# Patient Record
Sex: FEMALE | Race: ASIAN | Employment: FULL TIME | URBAN - METROPOLITAN AREA
[De-identification: names, ages, dates, MRNs, and addresses within clinical notes are randomized per-mention and may not be internally consistent; named-entity substitution may affect disease eponyms.]

---

## 2017-09-13 ENCOUNTER — OFFICE VISIT (OUTPATIENT)
Dept: FAMILY MEDICINE | Facility: CLINIC | Age: 37
End: 2017-09-13
Payer: COMMERCIAL

## 2017-09-13 VITALS
HEIGHT: 62 IN | HEART RATE: 88 BPM | TEMPERATURE: 98.4 F | BODY MASS INDEX: 16.56 KG/M2 | SYSTOLIC BLOOD PRESSURE: 103 MMHG | OXYGEN SATURATION: 97 % | WEIGHT: 90 LBS | DIASTOLIC BLOOD PRESSURE: 63 MMHG

## 2017-09-13 DIAGNOSIS — M54.2 CERVICALGIA: ICD-10-CM

## 2017-09-13 DIAGNOSIS — Z23 NEED FOR PROPHYLACTIC VACCINATION AND INOCULATION AGAINST INFLUENZA: ICD-10-CM

## 2017-09-13 DIAGNOSIS — Z00.00 ENCOUNTER FOR ROUTINE ADULT HEALTH EXAMINATION WITHOUT ABNORMAL FINDINGS: Primary | ICD-10-CM

## 2017-09-13 DIAGNOSIS — F51.04 CHRONIC INSOMNIA: ICD-10-CM

## 2017-09-13 DIAGNOSIS — G44.209 TENSION HEADACHE: ICD-10-CM

## 2017-09-13 PROCEDURE — G0145 SCR C/V CYTO,THINLAYER,RESCR: HCPCS | Performed by: PHYSICIAN ASSISTANT

## 2017-09-13 PROCEDURE — 90471 IMMUNIZATION ADMIN: CPT | Performed by: PHYSICIAN ASSISTANT

## 2017-09-13 PROCEDURE — 90686 IIV4 VACC NO PRSV 0.5 ML IM: CPT | Performed by: PHYSICIAN ASSISTANT

## 2017-09-13 PROCEDURE — 87624 HPV HI-RISK TYP POOLED RSLT: CPT | Performed by: PHYSICIAN ASSISTANT

## 2017-09-13 PROCEDURE — 99395 PREV VISIT EST AGE 18-39: CPT | Mod: 25 | Performed by: PHYSICIAN ASSISTANT

## 2017-09-13 RX ORDER — AMITRIPTYLINE HYDROCHLORIDE 50 MG/1
50 TABLET ORAL AT BEDTIME
Qty: 30 TABLET | Refills: 3 | Status: SHIPPED | OUTPATIENT
Start: 2017-09-13 | End: 2020-11-27

## 2017-09-13 NOTE — LETTER
September 25, 2017    Simran Lizarraga  [address] [place] [address]    Dear Simran,  We are happy to inform you that your PAP smear result from 09/13/17 is normal.  We are now able to do a follow up test on PAP smears. The DNA test is for HPV (Human Papilloma Virus). Cervical cancer is closely linked with certain types of HPV. Your result showed no evidence of high risk HPV.  Therefore we recommend you return in 3 years for your next pap smear.  You will still need to return to the clinic every year for an annual exam and other preventive tests.  Please contact the clinic at [phone] with any questions.  Sincerely,    Alka Washburn PA-C/jaz

## 2017-09-13 NOTE — MR AVS SNAPSHOT
After Visit Summary   9/13/2017    Simran Lizarraga    MRN: [id number]           Patient Information     Date Of Birth          1980        Visit Information        Provider Department      9/13/2017 1:45 PM Alka Washburn PA-C; [employer] [facility]        Today's Diagnoses     Encounter for routine adult health examination without abnormal findings    -  1    Tension headache        Cervicalgia        Chronic insomnia          Care Instructions      Preventive Health Recommendations  Female Ages 26 - 39  Yearly exam:   See your health care provider every year in order to    Review health changes.     Discuss preventive care.      Review your medicines if you your doctor has prescribed any.    Until age 30: Get a Pap test every three years (more often if you have had an abnormal result).    After age 30: Talk to your doctor about whether you should have a Pap test every 3 years or have a Pap test with HPV screening every 5 years.   You do not need a Pap test if your uterus was removed (hysterectomy) and you have not had cancer.  You should be tested each year for STDs (sexually transmitted diseases), if you're at risk.   Talk to your provider about how often to have your cholesterol checked.  If you are at risk for diabetes, you should have a diabetes test (fasting glucose).  Shots: Get a flu shot each year. Get a tetanus shot every 10 years.   Nutrition:     Eat at least 5 servings of fruits and vegetables each day.    Eat whole-grain bread, whole-wheat pasta and brown rice instead of white grains and rice.    Talk to your provider about Calcium and Vitamin D.     Lifestyle    Exercise at least 150 minutes a week (30 minutes a day, 5 days of the week). This will help you control your weight and prevent disease.    Limit alcohol to one drink per day.    No smoking.     Wear sunscreen to prevent skin cancer.    See your dentist every six months for an exam and  cleaning.            Follow-ups after your visit        Additional Services     SLEEP EVALUATION & MANAGEMENT REFERRAL - ADULT       Please be aware that coverage of these services is subject to the terms and limitations of your health insurance plan.  Call member services at your health plan with any benefit or coverage questions.      Please bring the following to your appointment:    >>   List of current medications   >>   This referral request   >>   Any documents/labs given to you for this referral    [facility])   [phone] (Age 18 and up)                  Future tests that were ordered for you today     Open Future Orders        Priority Expected Expires Ordered    Comprehensive metabolic panel Routine 9/14/2017 11/13/2017 9/13/2017    CBC with platelets Routine 9/14/2017 11/13/2017 9/13/2017    Lipid Profile Routine 9/14/2017 11/13/2017 9/13/2017    SLEEP EVALUATION & MANAGEMENT REFERRAL - ADULT Routine  9/13/2018 9/13/2017            Who to contact     If you have questions or need follow up information about today's clinic visit or your schedule please contact [facility] directly at [phone].  Normal or non-critical lab and imaging results will be communicated to you by MyChart, letter or phone within 4 business days after the clinic has received the results. If you do not hear from us within 7 days, please contact the clinic through MyChart or phone. If you have a critical or abnormal lab result, we will notify you by phone as soon as possible.  Submit refill requests through [employer] or call your pharmacy and they will forward the refill request to us. Please allow 3 business days for your refill to be completed.          Additional Information About Your Visit        Care EveryWhere ID     This is your Care EveryWhere ID. This could be used by other organizations to access your [facility] medical records  [id number]        Your Vitals Were     Pulse Temperature Height  "Last Period Pulse Oximetry BMI (Body Mass Index)    88 98.4  F (36.9  C) (Oral) 5' 1.75\" (1.568 m) 09/01/2017 (Exact Date) 97% 16.59 kg/m2       Blood Pressure from Last 3 Encounters:   09/13/17 103/63   07/25/16 99/64   05/06/15 105/69    Weight from Last 3 Encounters:   09/13/17 90 lb (40.8 kg)   07/25/16 91 lb 5 oz (41.4 kg)   05/06/15 91 lb (41.3 kg)              We Performed the Following     HPV High Risk Types DNA Cervical     Pap imaged thin layer screen with HPV - recommended age 30 - 65 years (select HPV order below)          Today's Medication Changes          These changes are accurate as of: 9/13/17  2:47 PM.  If you have any questions, ask your nurse or doctor.               Start taking these medicines.        Dose/Directions    amitriptyline 50 MG tablet   Commonly known as:  ELAVIL   Used for:  Tension headache, Chronic insomnia   Started by:  Alka Washburn PA-C        Dose:  50 mg   Take 1 tablet (50 mg) by mouth At Bedtime   Quantity:  30 tablet   Refills:  3            Where to get your medicines      These medications were sent to [place] [address] [place] [id number]     Phone:  [phone]     amitriptyline 50 MG tablet                Primary Care Provider Office Phone # Fax #    Alka Washburn PA-C [phone] [address] [address]        Equal Access to Services     [facility] AH: Hadii chioma castillo hadakshat Soana paula, waaxda luqadaha, qaybta kaalmada adejanet, ryan ivy. So [facility] [phone].    ATENCIÓN: Si habla español, tiene a walton disposición servicios gratuitos de asistencia lingüística. Llame al [phone].    We comply with applicable federal civil rights laws and [place] laws. We do not discriminate on the basis of race, color, national origin, age, disability sex, sexual orientation or gender identity.            Thank you!     Thank you for " choosing [facility]  for your care. Our goal is always to provide you with excellent care. Hearing back from our patients is one way we can continue to improve our services. Please take a few minutes to complete the written survey that you may receive in the mail after your visit with us. Thank you!             Your Updated Medication List - Protect others around you: Learn how to safely use, store and throw away your medicines at www.disposemymeds.org.          This list is accurate as of: 9/13/17  2:47 PM.  Always use your most recent med list.                   Brand Name Dispense Instructions for use Diagnosis    amitriptyline 50 MG tablet    ELAVIL    30 tablet    Take 1 tablet (50 mg) by mouth At Bedtime    Tension headache, Chronic insomnia       diphenhydrAMINE-acetaminophen  MG tablet    TYLENOL PM     Take by mouth At Bedtime

## 2017-09-13 NOTE — PROGRESS NOTES
Prior to injection verified patient identity using patient's name and date of birth.  Injectable Influenza Immunization Documentation    1.  Are you sick today? (Fever of 100.5 or higher on the day of the clinic)   No    2.  Have you ever had Guillain-[place] Syndrome within 6 weeks of an influenza vaccionation?  No    3. Do you have a life-threatening allergy to eggs?  No    4. Do you have a life-threatening allergy to a component of the vaccine? May include antibiotics, gelatin or latex.  No     5. Have you ever had a reaction to a dose of flu vaccine that needed immediate medical attention?  No     Form completed by patient.    Kenyetta Gordillo ,CMA

## 2017-09-13 NOTE — PROGRESS NOTES
"   SUBJECTIVE:   CC: Simran Lizarraga is an 37 year old woman who presents for preventive health visit.     Pt here with interpretor    She has neck pain for years described as a tightness in traps bilat up the neck and into the back of her head.  This has been going on since at least 2011 per Epic notes  This comes and goes  Seems to be getting worse.  Denies any injury.  Denies any radicular sxs    She is also having trouble sleeping x several years.  Hard time falling to sleep  Wakes easily  She is not sure why she wakes up  She is not sure if her neck pain waking her up  She tried a new pillow and mattress without relief.  She tried trazodone last year which worked for a few weeks, but then stopped working  Epic notes reveal she tried and failed melatonin as well  Tylenol PM does help but now has bloody nose  She is having some trouble shutting her brain off.  She drinks some coffee in the morning.  Wants \"therapy\" for her insomnia and not a pill      Healthy Habits:    Do you get at least three servings of calcium containing foods daily (dairy, green leafy vegetables, etc.)? yes    Amount of exercise or daily activities, outside of work: 5 day(s) per week    Problems taking medications regularly No    Medication side effects: No    Have you had an eye exam in the past two years? yes    Do you see a dentist twice per year? no    Do you have sleep apnea, excessive snoring or daytime drowsiness?no      Today's PHQ-2 Score:   PHQ-2 ( 1999 Pfizer) 9/13/2017 7/25/2016   Q1: Little interest or pleasure in doing things 0 0   Q2: Feeling down, depressed or hopeless 0 0   PHQ-2 Score 0 0         Abuse: Current or Past(Physical, Sexual or Emotional)- No  Do you feel safe in your environment - Yes  Social History   Substance Use Topics     Smoking status: Never Smoker     Smokeless tobacco: Never Used     Alcohol use Yes      Comment: sometimes     The patient does not drink >3 drinks per day nor >7 drinks per week.    Past " "Medical History:   Diagnosis Date     Headache(784.0) 8/4/2011     Menarche age    cycles Q mo x 5 d     Oligohydramnios      Past Surgical History:   Procedure Laterality Date     LASIK  2011     Current Outpatient Prescriptions   Medication Sig Dispense Refill     amitriptyline (ELAVIL) 50 MG tablet Take 1 tablet (50 mg) by mouth At Bedtime 30 tablet 3     diphenhydrAMINE-acetaminophen (TYLENOL PM)  MG tablet Take by mouth At Bedtime                ROS:  C: NEGATIVE for fever, chills, change in weight  I: NEGATIVE for worrisome rashes, moles or lesions  E: NEGATIVE for vision changes or irritation  ENT: NEGATIVE for ear, mouth and throat problems  R: NEGATIVE for significant cough or SOB  B: NEGATIVE for masses, tenderness or discharge  CV: NEGATIVE for chest pain, palpitations or peripheral edema  GI: NEGATIVE for nausea, abdominal pain, heartburn, or change in bowel habits  : NEGATIVE for unusual urinary or vaginal symptoms. Periods are regular.  M: neck pain as above  N: NEGATIVE for weakness, dizziness or paresthesias  P: NEGATIVE for changes in mood or affect    OBJECTIVE:   /63 (BP Location: Left arm, Cuff Size: Adult Small)  Pulse 88  Temp 98.4  F (36.9  C) (Oral)  Ht 5' 1.75\" (1.568 m)  Wt 90 lb (40.8 kg)  LMP 09/01/2017 (Exact Date)  SpO2 97%  BMI 16.59 kg/m2  EXAM:  GENERAL: healthy, alert and no distress  EYES: Eyes grossly normal to inspection, PERRL and conjunctivae and sclerae normal  HENT: ear canals and TM's normal, nose and mouth without ulcers or lesions  NECK: no adenopathy, no asymmetry, masses, or scars and thyroid normal to palpation  RESP: lungs clear to auscultation - no rales, rhonchi or wheezes  BREAST: normal without masses, tenderness or nipple discharge and no palpable axillary masses or adenopathy  CV: regular rate and rhythm, normal S1 S2, no S3 or S4, no murmur, click or rub, no peripheral edema and peripheral pulses strong  ABDOMEN: soft, nontender, no " "hepatosplenomegaly, no masses and bowel sounds normal  : no vulvar lesions. Speculum exam normal. Pap done and sent.  MS: no gross musculoskeletal defects noted, no edema  SKIN: no suspicious lesions or rashes  NEURO: Normal strength and tone, mentation intact and speech normal  PSYCH: mentation appears normal, affect normal/bright    ASSESSMENT/PLAN:   Assessment and Plan:     (Z00.00) Encounter for routine adult health examination without abnormal findings  (primary encounter diagnosis)  Comment:   Plan: Pap imaged thin layer screen with HPV -         recommended age 30 - 65 years (select HPV order        below), HPV High Risk Types DNA Cervical,         Comprehensive metabolic panel, CBC with         platelets, Lipid Profile            (G44.209) Tension headache  Comment:   Plan: Recd she try amitriptyline (ELAVIL) 50 MG tablet Qhs as this may help with the chronic neck pain and insomnia            (M54.2) Cervicalgia  Comment:   Plan: stretching, head, consider Pt if the elavil doesn't help    (F51.04) Chronic insomnia  Comment:   Plan: SLEEP EVALUATION & MANAGEMENT REFERRAL - ADULT,        amitriptyline (ELAVIL) 50 MG tablet            (Z23) Need for prophylactic vaccination and inoculation against influenza  Comment:   Plan: FLU VAC, SPLIT VIRUS IM > 3 YO (QUADRIVALENT)         [39719], Vaccine Administration, Initial         [31489]              COUNSELING:   Reviewed preventive health counseling, as reflected in patient instructions         reports that she has never smoked. She has never used smokeless tobacco.    Estimated body mass index is 16.59 kg/(m^2) as calculated from the following:    Height as of this encounter: 5' 1.75\" (1.568 m).    Weight as of this encounter: 90 lb (40.8 kg).       Counseling Resources:  ATP IV Guidelines  Pooled Cohorts Equation Calculator  Breast Cancer Risk Calculator  FRAX Risk Assessment  ICSI Preventive Guidelines  Dietary Guidelines for Americans, 2010  USDA's " MyPlate  ASA Prophylaxis  Lung CA Screening    Alka Washburn PA-C  [facility]

## 2017-09-13 NOTE — NURSING NOTE
"Chief Complaint   Patient presents with     Physical     Not fasting       Initial /63 (BP Location: Left arm, Cuff Size: Adult Small)  Pulse 88  Temp 98.4  F (36.9  C) (Oral)  Ht 5' 1.75\" (1.568 m)  Wt 90 lb (40.8 kg)  LMP 09/01/2017 (Exact Date)  SpO2 97%  BMI 16.59 kg/m2 Estimated body mass index is 16.59 kg/(m^2) as calculated from the following:    Height as of this encounter: 5' 1.75\" (1.568 m).    Weight as of this encounter: 90 lb (40.8 kg).  Medication Reconciliation: complete     Kenyetta Gordillo CMA      "

## 2017-09-13 NOTE — LETTER
[address] [address]  [phone]        November 20, 2017    Simran Lizarraga  [address] [place] [address]              Dear Simran Lizarraga    This is to remind you that your fasting labs are due.    You may call our office at [phone] to schedule an appointment.    Please disregard this notice if you have already had your labs drawn or made an appointment.        Sincerely,        [employer]ka [employer]

## 2017-09-18 LAB
COPATH REPORT: NORMAL
PAP: NORMAL

## 2017-09-19 LAB
FINAL DIAGNOSIS: NORMAL
HPV HR 12 DNA CVX QL NAA+PROBE: NEGATIVE
HPV16 DNA SPEC QL NAA+PROBE: NEGATIVE
HPV18 DNA SPEC QL NAA+PROBE: NEGATIVE
SPECIMEN DESCRIPTION: NORMAL

## 2017-10-06 ENCOUNTER — TELEPHONE (OUTPATIENT)
Dept: SLEEP MEDICINE | Facility: CLINIC | Age: 37
End: 2017-10-06

## 2017-10-06 NOTE — TELEPHONE ENCOUNTER
Spoke to patient in regards to scheduling a consult. Patient stated she will call back to schedule.

## 2017-11-30 DIAGNOSIS — Z00.00 ENCOUNTER FOR ROUTINE ADULT HEALTH EXAMINATION WITHOUT ABNORMAL FINDINGS: ICD-10-CM

## 2017-11-30 LAB
ALBUMIN SERPL-MCNC: 3.7 G/DL (ref 3.4–5)
ALP SERPL-CCNC: 53 U/L (ref 40–150)
ALT SERPL W P-5'-P-CCNC: 21 U/L (ref 0–50)
ANION GAP SERPL CALCULATED.3IONS-SCNC: 9 MMOL/L (ref 3–14)
AST SERPL W P-5'-P-CCNC: 15 U/L (ref 0–45)
BILIRUB SERPL-MCNC: 0.4 MG/DL (ref 0.2–1.3)
BUN SERPL-MCNC: 14 MG/DL (ref 7–30)
CALCIUM SERPL-MCNC: 8.7 MG/DL (ref 8.5–10.1)
CHLORIDE SERPL-SCNC: 106 MMOL/L (ref 94–109)
CHOLEST SERPL-MCNC: 124 MG/DL
CO2 SERPL-SCNC: 25 MMOL/L (ref 20–32)
CREAT SERPL-MCNC: 0.69 MG/DL (ref 0.52–1.04)
ERYTHROCYTE [DISTWIDTH] IN BLOOD BY AUTOMATED COUNT: 12.9 % (ref 10–15)
GFR SERPL CREATININE-BSD FRML MDRD: >90 ML/MIN/1.7M2
GLUCOSE SERPL-MCNC: 84 MG/DL (ref 70–99)
HCT VFR BLD AUTO: 40 % (ref 35–47)
HDLC SERPL-MCNC: 80 MG/DL
HGB BLD-MCNC: 13.3 G/DL (ref 11.7–15.7)
LDLC SERPL CALC-MCNC: 29 MG/DL
MCH RBC QN AUTO: 31.2 PG (ref 26.5–33)
MCHC RBC AUTO-ENTMCNC: 33.3 G/DL (ref 31.5–36.5)
MCV RBC AUTO: 94 FL (ref 78–100)
NONHDLC SERPL-MCNC: 44 MG/DL
PLATELET # BLD AUTO: 237 10E9/L (ref 150–450)
POTASSIUM SERPL-SCNC: 4.1 MMOL/L (ref 3.4–5.3)
PROT SERPL-MCNC: 7.4 G/DL (ref 6.8–8.8)
RBC # BLD AUTO: 4.26 10E12/L (ref 3.8–5.2)
SODIUM SERPL-SCNC: 140 MMOL/L (ref 133–144)
TRIGL SERPL-MCNC: 74 MG/DL
WBC # BLD AUTO: 5.7 10E9/L (ref 4–11)

## 2017-11-30 PROCEDURE — 80053 COMPREHEN METABOLIC PANEL: CPT | Performed by: PHYSICIAN ASSISTANT

## 2017-11-30 PROCEDURE — 36415 COLL VENOUS BLD VENIPUNCTURE: CPT | Performed by: PHYSICIAN ASSISTANT

## 2017-11-30 PROCEDURE — 85027 COMPLETE CBC AUTOMATED: CPT | Performed by: PHYSICIAN ASSISTANT

## 2017-11-30 PROCEDURE — 80061 LIPID PANEL: CPT | Performed by: PHYSICIAN ASSISTANT

## 2017-11-30 NOTE — PROGRESS NOTES
I wanted to get back to you with your test results.  I have enclosed a copy for your review.      I am happy to report that your CBC or complete blood count is normal with no signs of anemia, leukemia or platelet abnormalities. Your chemistry panel shows no signs of diabetes.  Your blood salts, kidney tests, liver tests, and proteins are all fine.    Your total cholesterol is 124 with the normal range being below 200.  Your HDL or good cholesterol is 80 with the normal range being above 50.  Your LDL or bad cholesterol is 29 with the normal range being below 160.  Everything looks great.    Alka Washburn PA-C

## 2017-11-30 NOTE — LETTER
[address]  [place]  [address]  Tel: [phone]    November 30, 2017    Simran Elham  [address] [place] [address]        Dear Ms. Lizarraga,    I wanted to get back to you with your test results.  I have enclosed a copy for your review.      I am happy to report that your CBC or complete blood count is normal with no signs of anemia, leukemia or platelet abnormalities. Your chemistry panel shows no signs of diabetes.  Your blood salts, kidney tests, liver tests, and proteins are all fine.    Your total cholesterol is 124 with the normal range being below 200.  Your HDL or good cholesterol is 80 with the normal range being above 50.  Your LDL or bad cholesterol is 29 with the normal range being below 160.  Everything looks great.    If you have any further questions or problems, please contact our office.      Sincerely,    Alka Washburn PA-C/ANA LILIA      Enclosure: Lab Results  Results for orders placed or performed in visit on 11/30/17   Comprehensive metabolic panel   Result Value Ref Range    Sodium 140 133 - 144 mmol/L    Potassium 4.1 3.4 - 5.3 mmol/L    Chloride 106 94 - 109 mmol/L    Carbon Dioxide 25 20 - 32 mmol/L    Anion Gap 9 3 - 14 mmol/L    Glucose 84 70 - 99 mg/dL    Urea Nitrogen 14 7 - 30 mg/dL    Creatinine 0.69 0.52 - 1.04 mg/dL    GFR Estimate >90 >60 mL/min/1.7m2    GFR Estimate If Black >90 >60 mL/min/1.7m2    Calcium 8.7 8.5 - 10.1 mg/dL    Bilirubin Total 0.4 0.2 - 1.3 mg/dL    Albumin 3.7 3.4 - 5.0 g/dL    Protein Total 7.4 6.8 - 8.8 g/dL    Alkaline Phosphatase 53 40 - 150 U/L    ALT 21 0 - 50 U/L    AST 15 0 - 45 U/L   CBC with platelets   Result Value Ref Range    WBC 5.7 4.0 - 11.0 10e9/L    RBC Count 4.26 3.8 - 5.2 10e12/L    Hemoglobin 13.3 11.7 - 15.7 g/dL    Hematocrit 40.0 35.0 - 47.0 %    MCV 94 78 - 100 fl    MCH 31.2 26.5 - 33.0 pg    MCHC 33.3 31.5 - 36.5 g/dL    RDW 12.9 10.0 - 15.0 %    Platelet Count 237 150 - 450 10e9/L    Lipid Profile   Result Value Ref Range    Cholesterol 124 <200 mg/dL    Triglycerides 74 <150 mg/dL    HDL Cholesterol 80 >49 mg/dL    LDL Cholesterol Calculated 29 <100 mg/dL    Non HDL Cholesterol 44 <130 mg/dL

## 2020-03-01 ENCOUNTER — HEALTH MAINTENANCE LETTER (OUTPATIENT)
Age: 40
End: 2020-03-01

## 2020-11-27 ENCOUNTER — OFFICE VISIT (OUTPATIENT)
Dept: FAMILY MEDICINE | Facility: CLINIC | Age: 40
End: 2020-11-27
Payer: COMMERCIAL

## 2020-11-27 VITALS
OXYGEN SATURATION: 99 % | BODY MASS INDEX: 17.55 KG/M2 | WEIGHT: 95.38 LBS | TEMPERATURE: 97.8 F | HEIGHT: 62 IN | SYSTOLIC BLOOD PRESSURE: 106 MMHG | HEART RATE: 80 BPM | DIASTOLIC BLOOD PRESSURE: 68 MMHG

## 2020-11-27 DIAGNOSIS — Z00.00 ROUTINE GENERAL MEDICAL EXAMINATION AT A HEALTH CARE FACILITY: Primary | ICD-10-CM

## 2020-11-27 DIAGNOSIS — Z13.1 SCREENING FOR DIABETES MELLITUS: ICD-10-CM

## 2020-11-27 DIAGNOSIS — Z11.59 ENCOUNTER FOR HEPATITIS C SCREENING TEST FOR LOW RISK PATIENT: ICD-10-CM

## 2020-11-27 DIAGNOSIS — G47.00 INSOMNIA, UNSPECIFIED TYPE: ICD-10-CM

## 2020-11-27 DIAGNOSIS — Z13.220 SCREENING FOR LIPID DISORDERS: ICD-10-CM

## 2020-11-27 DIAGNOSIS — Z12.4 SCREENING FOR CERVICAL CANCER: ICD-10-CM

## 2020-11-27 LAB
CHOLEST SERPL-MCNC: 132 MG/DL
GLUCOSE SERPL-MCNC: 113 MG/DL (ref 70–99)
HDLC SERPL-MCNC: 74 MG/DL
HGB BLD-MCNC: 13.2 G/DL (ref 11.7–15.7)
LDLC SERPL CALC-MCNC: 21 MG/DL
NONHDLC SERPL-MCNC: 58 MG/DL
TRIGL SERPL-MCNC: 186 MG/DL

## 2020-11-27 PROCEDURE — 80061 LIPID PANEL: CPT | Performed by: INTERNAL MEDICINE

## 2020-11-27 PROCEDURE — 82947 ASSAY GLUCOSE BLOOD QUANT: CPT | Performed by: INTERNAL MEDICINE

## 2020-11-27 PROCEDURE — 90471 IMMUNIZATION ADMIN: CPT | Performed by: INTERNAL MEDICINE

## 2020-11-27 PROCEDURE — 36415 COLL VENOUS BLD VENIPUNCTURE: CPT | Performed by: INTERNAL MEDICINE

## 2020-11-27 PROCEDURE — 90686 IIV4 VACC NO PRSV 0.5 ML IM: CPT | Performed by: INTERNAL MEDICINE

## 2020-11-27 PROCEDURE — 85018 HEMOGLOBIN: CPT | Performed by: INTERNAL MEDICINE

## 2020-11-27 PROCEDURE — 99385 PREV VISIT NEW AGE 18-39: CPT | Mod: 25 | Performed by: INTERNAL MEDICINE

## 2020-11-27 PROCEDURE — G0145 SCR C/V CYTO,THINLAYER,RESCR: HCPCS | Performed by: INTERNAL MEDICINE

## 2020-11-27 PROCEDURE — 86803 HEPATITIS C AB TEST: CPT | Performed by: INTERNAL MEDICINE

## 2020-11-27 PROCEDURE — 87624 HPV HI-RISK TYP POOLED RSLT: CPT | Performed by: INTERNAL MEDICINE

## 2020-11-27 ASSESSMENT — MIFFLIN-ST. JEOR: SCORE: 1057.87

## 2020-11-27 NOTE — PROGRESS NOTES
SUBJECTIVE:   CC: Simran Lizarraga is an 40 year old woman who presents for preventive health visit.     Lives with  and two kids.  Ages 7 and 11.  Works in computer science, working from home.     Her father  from a stroke in her mother  from liver cancer.  She is wondering if there is any blood work that is needed to screen for these conditions.    She has a long history of insomnia.  Has been taking different over-the-counter sleep aids for years.  She would like to see a specialist to see if the insomnia can be treated with something besides medications.    Patient has been advised of split billing requirements and indicates understanding: Yes  Healthy Habits:    Do you get at least three servings of calcium containing foods daily (dairy, green leafy vegetables, etc.)? yes    Amount of exercise or daily activities, outside of work: 7 day(s) per week    Problems taking medications regularly No    Medication side effects: No    Have you had an eye exam in the past two years? no    Do you see a dentist twice per year? yes    Do you have sleep apnea, excessive snoring or daytime drowsiness?yes      Today's PHQ-2 Score:   PHQ-2 (  [employer]) 2020   Q1: Little interest or pleasure in doing things 0 0   Q2: Feeling down, depressed or hopeless 0 0   PHQ-2 Score 0 0       Abuse: Current or Past(Physical, Sexual or Emotional)- No  Do you feel safe in your environment? Yes        Social History     Tobacco Use     Smoking status: Never Smoker     Smokeless tobacco: Never Used   Substance Use Topics     Alcohol use: Yes     Comment: sometimes     If you drink alcohol do you typically have >3 drinks per day or >7 drinks per week? No                     Reviewed orders with patient.  Reviewed health maintenance and updated orders accordingly - Yes  Patient Active Problem List   Diagnosis     CARDIOVASCULAR SCREENING; LDL GOAL LESS THAN 160     Headache     ; [facility]->[facility] care     Vitamin D  deficiency <24     + GBS on UC w/o sxs     Oligohydramnios in third trimester of the first pregnancy     Fetal VSD     SROM (spontaneous rupture of membranes)     Persistent insomnia     Swelling, mass, or lump in head and neck     Past Surgical History:   Procedure Laterality Date     LASIK  2011       Social History     Tobacco Use     Smoking status: Never Smoker     Smokeless tobacco: Never Used   Substance Use Topics     Alcohol use: Yes     Comment: sometimes     Family History   Problem Relation Age of Onset     Hypertension Father      Cerebrovascular Disease Father      Cancer Mother         liver cancer     Other - See Comments Daughter         neuroblastoma.         Current Outpatient Medications   Medication Sig Dispense Refill     diphenhydrAMINE HCl, Sleep, (ZZZQUIL PO)        diphenhydrAMINE-acetaminophen (TYLENOL PM)  MG tablet Take by mouth At Bedtime       doxylamine (UNISOM) 25 MG TABS tablet Take 25 mg by mouth At Bedtime       Ibuprofen-diphenhydrAMINE Cit (ADVIL PM PO)          Mammogram not appropriate for this patient based on age.    Pertinent mammograms are reviewed under the imaging tab.  History of abnormal Pap smear: NO - age 30-65 PAP every 5 years with negative HPV co-testing recommended  PAP / HPV Latest Ref Rng & Units 9/13/2017 11/24/2014 8/4/2011   PAP - NIL NIL NIL   HPV 16 DNA NEG:Negative Negative - -   HPV 18 DNA NEG:Negative Negative - -   OTHER HR HPV NEG:Negative Negative - -     Reviewed and updated as needed this visit by clinical staff  Tobacco  Allergies  Meds   Med Hx  Surg Hx  Fam Hx  Soc Hx        Reviewed and updated as needed this visit by Provider  Tobacco   Meds   Med Hx  Surg Hx  Fam Hx  Soc Hx           ROS:  CONSTITUTIONAL: NEGATIVE for fever, chills, change in weight  INTEGUMENTARU/SKIN: NEGATIVE for worrisome rashes, moles or lesions  EYES: NEGATIVE for vision changes or irritation  ENT: NEGATIVE for ear, mouth and throat problems  RESP:  "NEGATIVE for significant cough or SOB  BREAST: NEGATIVE for masses, tenderness or discharge  CV: NEGATIVE for chest pain, palpitations or peripheral edema  GI: NEGATIVE for nausea, abdominal pain, heartburn, or change in bowel habits  : NEGATIVE for unusual urinary or vaginal symptoms. Periods are regular.  MUSCULOSKELETAL: NEGATIVE for significant arthralgias or myalgia  NEURO: NEGATIVE for weakness, dizziness or paresthesias  PSYCHIATRIC: NEGATIVE for changes in mood or affect    OBJECTIVE:   /68   Pulse 80   Temp 97.8  F (36.6  C) (Tympanic)   Ht 1.57 m (5' 1.81\")   Wt 43.3 kg (95 lb 6 oz)   LMP 10/30/2020   SpO2 99%   BMI 17.55 kg/m    EXAM:  GENERAL: healthy, alert and no distress  EYES: Eyes grossly normal to inspection, PERRL and conjunctivae and sclerae normal  HENT: ear canals and TM's normal, mouth without ulcers or lesions  NECK: no adenopathy, no asymmetry, masses, or scars and thyroid normal to palpation  RESP: lungs clear to auscultation - no rales, rhonchi or wheezes  BREAST: normal without masses, tenderness or nipple discharge and no palpable axillary masses or adenopathy  CV: regular rate and rhythm, normal S1 S2, no S3 or S4, no murmur, click or rub, no peripheral edema and peripheral pulses strong  ABDOMEN: soft, nontender, no hepatosplenomegaly, no masses and bowel sounds normal   (female): normal female external genitalia, normal urethral meatus, vaginal mucosa pink, moist, well rugated, and normal cervix, slight bleeding with pap   MS: no gross musculoskeletal defects noted, no edema  SKIN: no suspicious lesions or rashes  NEURO: Normal strength and tone, mentation intact and speech normal  PSYCH: mentation appears normal, affect normal/bright        ASSESSMENT/PLAN:   1. Routine general medical examination at a health care facility  Pap today   Blood work   Flu shot  Using condoms for contraception, declines desire for other method    - Hemoglobin    2. Insomnia, unspecified " "type  - SLEEP EVALUATION & MANAGEMENT REFERRAL - ADULT -[facility] [place] [phone]  (Age 18 and up); Future    3. Screening for lipid disorders  - Lipid panel reflex to direct LDL Fasting    4. Screening for cervical cancer  - Pap imaged thin layer screen with HPV - recommended age 30 - 65 years (select HPV order below)  - HPV High Risk Types DNA Cervical    5. Screening for diabetes mellitus  - Glucose    6. 7. Encounter for hepatitis C screening test for low risk patient  - Hepatitis C antibody      COUNSELING:   Reviewed preventive health counseling, as reflected in patient instructions       Regular exercise       Healthy diet/nutrition       Contraception    Estimated body mass index is 17.55 kg/m  as calculated from the following:    Height as of this encounter: 1.57 m (5' 1.81\").    Weight as of this encounter: 43.3 kg (95 lb 6 oz).        She reports that she has never smoked. She has never used smokeless tobacco.      Counseling Resources:  ATP IV Guidelines  Pooled Cohorts Equation Calculator  Breast Cancer Risk Calculator  BRCA-Related Cancer Risk Assessment: FHS-7 Tool  FRAX Risk Assessment  ICSI Preventive Guidelines  Dietary Guidelines for Americans, 2010  [employer]'s MyPlate  ASA Prophylaxis  Lung CA Screening    Emily Riddle MD  [facility]  "

## 2020-11-29 LAB — HCV AB SERPL QL IA: NONREACTIVE

## 2020-12-01 LAB
COPATH REPORT: NORMAL
PAP: NORMAL

## 2020-12-03 LAB
FINAL DIAGNOSIS: NORMAL
HPV HR 12 DNA CVX QL NAA+PROBE: NEGATIVE
HPV16 DNA SPEC QL NAA+PROBE: NEGATIVE
HPV18 DNA SPEC QL NAA+PROBE: NEGATIVE
SPECIMEN DESCRIPTION: NORMAL
SPECIMEN SOURCE CVX/VAG CYTO: NORMAL

## 2021-05-07 ENCOUNTER — VIRTUAL VISIT (OUTPATIENT)
Dept: SLEEP MEDICINE | Facility: CLINIC | Age: 41
End: 2021-05-07
Attending: INTERNAL MEDICINE
Payer: COMMERCIAL

## 2021-05-07 VITALS — HEIGHT: 61 IN | WEIGHT: 96 LBS | BODY MASS INDEX: 18.12 KG/M2

## 2021-05-07 DIAGNOSIS — G47.00 PERSISTENT INSOMNIA: Primary | ICD-10-CM

## 2021-05-07 DIAGNOSIS — G47.8 NON-RESTORATIVE SLEEP: ICD-10-CM

## 2021-05-07 DIAGNOSIS — M54.2 CHRONIC NECK PAIN: ICD-10-CM

## 2021-05-07 DIAGNOSIS — G89.29 CHRONIC NECK PAIN: ICD-10-CM

## 2021-05-07 DIAGNOSIS — G47.9 SLEEP DISTURBANCE: ICD-10-CM

## 2021-05-07 DIAGNOSIS — R51.9 SLEEP RELATED HEADACHES: ICD-10-CM

## 2021-05-07 PROCEDURE — 99204 OFFICE O/P NEW MOD 45 MIN: CPT | Mod: TEL | Performed by: INTERNAL MEDICINE

## 2021-05-07 ASSESSMENT — MIFFLIN-ST. JEOR: SCORE: 1042.83

## 2021-05-07 NOTE — PROGRESS NOTES
"Simran Lizarraga is a 41 year old female being evaluated via a billable telephone visit.     \"This telephone visit will be conducted via a call between you and your physician/provider. We have found that certain health care needs can be provided without the need for an in-person visit or physical exam.  This service lets us provide the care you need with a telephone conversation.  If a prescription is necessary we can send it directly to your pharmacy.  If lab work is needed we can place an order for that and you can then stop by our lab to have the test done at a later time.\"    Telephone visits are billed at different rates depending on your insurance coverage.  Please reach out to your insurance provider with any questions.    Patient has given verbal consent for  a Telephone visit? Yes    What telephone number would you like your provider to contact at at:  [phone]    How would you like to obtain your AVS? Buddy Castellon [facility]    Telephone Visit Details:     Telephone Visit Start Time: 9:59 AM    Telephone Visit End Time:10:51 AM    All the information was obtained and communicated with the help of the interpretor.    Thank you for the opportunity to participate in the care of  Simran Lizarraga.    Assessment and Plan:    In summary Simran Lizarraga is a 41 year old year old female here for sleep disturbance.  1. Persistent Insomnia/Non-restorative sleep/Sleep related headaches/Chronic neck pain/Sleep disturbance   Simran Lizarraga has high risk for obstructive sleep apnea based on the history of persistent insomnia, non-restoratvie sleep and sleep related head and neck pains. I recommend getting  an baseline nocturnal polysomnography. The patient should return to the clinic to discuss results and treatment option in a patient-centered approach. I recommend the patient to discontinue Tylenol and Ibuprofen PM. I will also refer the patient to be evaluated by a sleep psychologist. She will follow up " with her PCP about the neck pain.  - SLEEP PSYCHOLOGY REFERRAL  - Comprehensive Sleep Study; Future    History of present illness:    She is a 41 year old female who comes to the [facility] clinic with a chief complaint of persistent insomnia that has been going on all of her life. While she denies any episodes of witness apnea or snoring, she has been having difficulty initiating and maintaining sleep. She has been self medicating with OTC sleep aides such as Tylenol PM as well as Ibuprofen. She has also used Unysom, Melatonin and Zqueel. PMFor the past year, her symptoms have worsened such that she is waking up with head and neck pains. Her neck pain starts in her back, it feels like a dull, numb pain and radiates down both shoulders. While she does suffer from some anxiety, she denies it being a significant factor.     Ideal Sleep-Wake Cycle(devoid of societal pressure):    Patient would try to initiate sleep at around Midnight with a sleep latency of 2 hours. The patient would have 4-5 awakenings. Final wake up time is around 6AM.    ROYCE:  ROYCE Total Score: 18  Total score - [place]: 2 (5/7/2021  9:00 AM)    Patient told to return in one week after the sleep study is interpreted.    Patient Active Problem List   Diagnosis     CARDIOVASCULAR SCREENING; LDL GOAL LESS THAN 160     Vitamin D deficiency <24     Persistent insomnia     Past Medical History:   Diagnosis Date     Insomnia      Past Surgical History:   Procedure Laterality Date     LASIK  2011     Current Outpatient Medications   Medication Sig Dispense Refill     diphenhydrAMINE HCl, Sleep, (ZZZQUIL PO)        diphenhydrAMINE-acetaminophen (TYLENOL PM)  MG tablet Take by mouth At Bedtime       doxylamine (UNISOM) 25 MG TABS tablet Take 25 mg by mouth At Bedtime       Ibuprofen-diphenhydrAMINE Cit (ADVIL PM PO)        No known allergies  Social History     Socioeconomic History     Marital status:      Spouse name: Not on file     Number of  children: 1     Years of education: Not on file     Highest education level: Not on file   Occupational History     Not on file   Social Needs     Financial resource strain: Not on file     Food insecurity     Worry: Not on file     Inability: Not on file     Transportation needs     Medical: Not on file     Non-medical: Not on file   Tobacco Use     Smoking status: Never Smoker     Smokeless tobacco: Never Used   Substance and Sexual Activity     Alcohol use: Yes     Comment: sometimes     Drug use: No     Sexual activity: Yes     Partners: Male     Birth control/protection: Condom   Lifestyle     Physical activity     Days per week: Not on file     Minutes per session: Not on file     Stress: Not on file   Relationships     Social connections     Talks on phone: Not on file     Gets together: Not on file     Attends Shinto service: Not on file     Active member of club or organization: Not on file     Attends meetings of clubs or organizations: Not on file     Relationship status: Not on file     Intimate partner violence     Fear of current or ex partner: Not on file     Emotionally abused: Not on file     Physically abused: Not on file     Forced sexual activity: Not on file   Other Topics Concern     Parent/sibling w/ CABG, MI or angioplasty before 65F 55M? No   Social History Narrative        2 children ages 7 and 3.5    Computer science     Family History   Problem Relation Age of Onset     Hypertension Father      Cerebrovascular Disease Father      Cancer Mother         liver cancer     Other - See Comments Daughter         neuroblastoma.        Labs/Studies:     No results found for: PH, PHARTERIAL, PO2, [id number], SAT, PCO2, HCO3, BASEEXCESS, JAYSHREE, BEB  Lab Results   Component Value Date    TSH 0.89 07/25/2016    TSH 0.77 11/24/2014     Lab Results   Component Value Date     (H) 11/27/2020    GLC 84 11/30/2017     Lab Results   Component Value Date    HGB 13.2 11/27/2020    HGB 13.3  11/30/2017     Lab Results   Component Value Date    BUN 14 11/30/2017    BUN 17 07/25/2016    CR 0.69 11/30/2017    CR 0.53 07/25/2016     Lab Results   Component Value Date    AST 15 11/30/2017    AST 13 07/25/2016    ALT 21 11/30/2017    ALT 18 07/25/2016    ALKPHOS 53 11/30/2017    ALKPHOS 58 07/25/2016    BILITOTAL 0.4 11/30/2017    BILITOTAL 0.3 07/25/2016     No results found for: UAMP, UBARB, BENZODIAZEUR, UCANN, UCOC, OPIT, UPCP    Recent Labs   Lab Test 11/27/20  1520 11/30/17  0906 08/10/16  0752 07/25/16  1050   NA  --  140  --  138   POTASSIUM  --  4.1  --  3.9   CHLORIDE  --  106  --  107   CO2  --  25  --  26   ANIONGAP  --  9  --  5   * 84  --  58*   BUN  --  14  --  17   CR  --  0.69  --  0.53   DASIA  --  8.7 9.4 8.3*       No results found for: KEESHA Pérez DO  Board Certified in Internal Medicine and Sleep Medicine    (Note created with Dragon voice recognition and unintended spelling errors and word substitutions may occur)

## 2021-05-07 NOTE — PATIENT INSTRUCTIONS
Your BMI is Body mass index is 18.14 kg/m .  Weight management is a personal decision.  If you are interested in exploring weight loss strategies, the following discussion covers the approaches that may be successful. Body mass index (BMI) is one way to tell whether you are at a healthy weight, overweight, or obese. It measures your weight in relation to your height.  A BMI of 18.5 to 24.9 is in the healthy range. A person with a BMI of 25 to 29.9 is considered overweight, and someone with a BMI of 30 or greater is considered obese. More than two-thirds of American adults are considered overweight or obese.  Being overweight or obese increases the risk for further weight gain. Excess weight may lead to heart disease and diabetes.  Creating and following plans for healthy eating and physical activity may help you improve your health.  Weight control is part of healthy lifestyle and includes exercise, emotional health, and healthy eating habits. Careful eating habits lifelong are the mainstay of weight control. Though there are significant health benefits from weight loss, long-term weight loss with diet alone may be very difficult to achieve- studies show long-term success with dietary management in less than 10% of people. Attaining a healthy weight may be especially difficult to achieve in those with severe obesity. In some cases, medications, devices and surgical management might be considered.  What can you do?  If you are overweight or obese and are interested in methods for weight loss, you should discuss this with your provider.     Consider reducing daily calorie intake by 500 calories.     Keep a food journal.     Avoiding skipping meals, consider cutting portions instead.    Diet combined with exercise helps maintain muscle while optimizing fat loss. Strength training is particularly important for building and maintaining muscle mass. Exercise helps reduce stress, increase energy, and improves fitness.  Increasing exercise without diet control, however, may not burn enough calories to loose weight.       Start walking three days a week 10-20 minutes at a time    Work towards walking thirty minutes five days a week     Eventually, increase the speed of your walking for 1-2 minutes at time    In addition, we recommend that you review healthy lifestyles and methods for weight loss available through the National Institutes of Health patient information sites:  http://win.niddk.nih.gov/publications/index.htm    And look into health and wellness programs that may be available through your health insurance provider, employer, local community center, or urszula club.    Dr. Andre Blancas is at the following clinics on the following days:  [facility] - [address], [place]        Thursday and Friday (PLEASE CALL [phone] to schedule an appointment).  [facility] - [address] [place], [place]       Wednesdays   (PLEASE CALL [phone] to schedule an appointment).    Patient education: What is a sleep study?     What is a sleep study? -- A sleep study is a test that measures how well you sleep and checks for sleep problems. For some sleep studies, you stay overnight in a sleep lab at a hospital or sleep center.     What happens during a sleep study? -- Before you go to sleep, a technician attaches small, sticky patches called  electrodes  to your head, chest, and legs. He or she will also place a small tube beneath your nose and might wrap 1 or 2 belts around your chest.   Each of these items has wires that connect to monitors. The monitors record your movement, brain activity, breathing, and other body functions while you sleep.  If you have a history of trouble falling asleep, your doctor might prescribe a medicine to help you fall asleep in the lab. If you have never taken the medicine before, your doctor might ask you take it on a night before your sleep study to see how it affects  you.   Why might my doctor order a sleep study? -- Your doctor will order a sleep study if he or she thinks you have sleep apnea or a different condition that makes you:   ?Have sudden jerking leg movements while you sleep, called  periodic limb movements.    ?Feel very sleepy during the day and fall asleep all of a sudden, called  narcolepsy.    ?Have trouble falling asleep or staying asleep over a long period of time, called  chronic insomnia.    ?Do odd things while you sleep, such as walking.  How should I prepare for a sleep study? -- On the day of your sleep study, you should:   ?Avoid alcohol   ?Avoid drinking coffee, tea, sodas, and other drinks that have caffeine in the afternoon and evening   ?Take all of your regular medicines     The cost of care estimate line is [phone]. They are able to give the patient an estimate of the charges and also an estimate of their insurance coverage/patient responsibility.   After your sleep study is performed, please call us at [phone] or [phone]  to schedule for a follow up to review the results of the sleep study.    Please bring one tab of low dose melatonin 3 mg or less to the night of the study.    Melatonin intake is completely voluntary.    You may take own melatonin after arrival to sleep center. Do not drive or operate machinery after intake of melatonin.

## 2021-09-13 ENCOUNTER — TELEPHONE (OUTPATIENT)
Dept: FAMILY MEDICINE | Facility: CLINIC | Age: 41
End: 2021-09-13

## 2021-09-13 ENCOUNTER — TELEPHONE (OUTPATIENT)
Dept: SLEEP MEDICINE | Facility: CLINIC | Age: 41
End: 2021-09-13

## 2021-09-13 NOTE — TELEPHONE ENCOUNTER
Pt calling with multiple issues of paleness in face upon waking and pain in the back of head that started about a month ago.  Scheduled with Dr. Riddle for 9/22 and wondering if should be seen sooner?    Advised should be seen sooner with the headache and dizziness and scheduled for Friday 9/17 at 1pm since pt is out of town until Thursday,    Priya GERARDO RN  EP Triage

## 2021-09-13 NOTE — TELEPHONE ENCOUNTER
Reason for call:  Other   Patient called regarding (reason for call): appointment  Additional comments: Please call Patient to schedule sleep study     Phone number to reach patient:  Home number on file [phone] (home)    Best Time:  any    Can we leave a detailed message on this number?  YES    Travel screening: Not Applicable

## 2021-09-17 ENCOUNTER — OFFICE VISIT (OUTPATIENT)
Dept: FAMILY MEDICINE | Facility: CLINIC | Age: 41
End: 2021-09-17
Payer: COMMERCIAL

## 2021-09-17 VITALS
HEIGHT: 61 IN | HEART RATE: 69 BPM | OXYGEN SATURATION: 100 % | BODY MASS INDEX: 18.54 KG/M2 | SYSTOLIC BLOOD PRESSURE: 108 MMHG | WEIGHT: 98.2 LBS | RESPIRATION RATE: 8 BRPM | DIASTOLIC BLOOD PRESSURE: 64 MMHG | TEMPERATURE: 97.7 F

## 2021-09-17 DIAGNOSIS — R42 VERTIGO: ICD-10-CM

## 2021-09-17 DIAGNOSIS — R51.9 DAILY HEADACHE: Primary | ICD-10-CM

## 2021-09-17 DIAGNOSIS — M54.2 NECK PAIN: ICD-10-CM

## 2021-09-17 DIAGNOSIS — R23.1 PALE: ICD-10-CM

## 2021-09-17 LAB
ERYTHROCYTE [DISTWIDTH] IN BLOOD BY AUTOMATED COUNT: 13.4 % (ref 10–15)
ERYTHROCYTE [SEDIMENTATION RATE] IN BLOOD BY WESTERGREN METHOD: 9 MM/HR (ref 0–20)
HCT VFR BLD AUTO: 42.6 % (ref 35–47)
HGB BLD-MCNC: 14.1 G/DL (ref 11.7–15.7)
MCH RBC QN AUTO: 31.1 PG (ref 26.5–33)
MCHC RBC AUTO-ENTMCNC: 33.1 G/DL (ref 31.5–36.5)
MCV RBC AUTO: 94 FL (ref 78–100)
PLATELET # BLD AUTO: 263 10E3/UL (ref 150–450)
RBC # BLD AUTO: 4.53 10E6/UL (ref 3.8–5.2)
WBC # BLD AUTO: 5.9 10E3/UL (ref 4–11)

## 2021-09-17 PROCEDURE — 80053 COMPREHEN METABOLIC PANEL: CPT | Performed by: INTERNAL MEDICINE

## 2021-09-17 PROCEDURE — 82728 ASSAY OF FERRITIN: CPT | Performed by: INTERNAL MEDICINE

## 2021-09-17 PROCEDURE — 85027 COMPLETE CBC AUTOMATED: CPT | Performed by: INTERNAL MEDICINE

## 2021-09-17 PROCEDURE — 36415 COLL VENOUS BLD VENIPUNCTURE: CPT | Performed by: INTERNAL MEDICINE

## 2021-09-17 PROCEDURE — 85652 RBC SED RATE AUTOMATED: CPT | Performed by: INTERNAL MEDICINE

## 2021-09-17 PROCEDURE — 99214 OFFICE O/P EST MOD 30 MIN: CPT | Performed by: INTERNAL MEDICINE

## 2021-09-17 PROCEDURE — 84443 ASSAY THYROID STIM HORMONE: CPT | Performed by: INTERNAL MEDICINE

## 2021-09-17 RX ORDER — CYCLOBENZAPRINE HCL 5 MG
5-10 TABLET ORAL 3 TIMES DAILY PRN
Qty: 30 TABLET | Refills: 1 | Status: SHIPPED | OUTPATIENT
Start: 2021-09-17

## 2021-09-17 ASSESSMENT — MIFFLIN-ST. JEOR: SCORE: 1052.81

## 2021-09-17 ASSESSMENT — PAIN SCALES - GENERAL: PAINLEVEL: NO PAIN (0)

## 2021-09-17 NOTE — PROGRESS NOTES
Assessment & Plan     Daily headache  The headache may be multifactorial given her neck pain, poor sleep, and stress from work and/or medication overuse.  However, it is new and persistent as well as associated with vomiting.  She is also having vertigo, this does not sound like BPPV and that her [place]-Hallpike is completely negative.  This may or may not be related to her headaches.  Regardless, given all of these symptoms together, I do think imaging is warranted.  - MR Brain w/o Contrast; Future  - TSH with free T4 reflex; Future  - TSH with free T4 reflex    Vertigo  As above   - MR Brain w/o Contrast; Future    Pale  I am not sure what to make of this pallor in the morning.  She is quite concerned by it as is her family.  She did have a normal hemoglobin at her physical last year, but will recheck along with other basic blood work.  - CBC with platelets; Future  - Ferritin; Future  - Comprehensive metabolic panel (BMP + Alb, Alk Phos, ALT, AST, Total. Bili, TP); Future  - ESR: Erythrocyte sedimentation rate; Future  - TSH with free T4 reflex; Future  - CBC with platelets  - Ferritin  - Comprehensive metabolic panel (BMP + Alb, Alk Phos, ALT, AST, Total. Bili, TP)  - ESR: Erythrocyte sedimentation rate  - TSH with free T4 reflex    Neck pain  Unclear if this is a cause or result of her headaches or separate issue.  Recommended heating pad, muscle relaxant as needed, she is already taking Tylenol or NSAIDs for the headache.  Recommended massage or chiropractor.  She asked about acupuncture, that is also very reasonable to try.  - cyclobenzaprine (FLEXERIL) 5 MG tablet; Take 1-2 tablets (5-10 mg) by mouth 3 times daily as needed for muscle spasms      Will follow up after MRI results.       Return in about 3 months (around 12/17/2021) for Physical Exam.    Emily Riddle MD  [facility] is a 41 year old who presents for the following health issues     HPI  "    Simran is here with headache, dizziness, and pallor.  She is most concerned about the pallor.  Her family has commented that she is very pale when she wakes up in the morning, this started about 2 months ago.  Seems to be getting worse.  She has also had a daily headache for the past couple months.  It is worse right when she wakes up in the morning, often gets better throughout the day, but not always.  The days when the headache continues all day, she will sometimes vomit in the afternoon.  They usually improve with Tylenol or ibuprofen.  The pain is sort of at the top and back of her head.  She denies any focal numbness, weakness, tingling, ataxia.  She does have some blurry vision, but thinks that is more of an eye issue.      She has also been experiencing some like vertigo.  About a month ago, had a week where it was pretty persistent, felt vertigo with turning over in bed, walking, getting up out of bed.  It was pretty bothersome.  That has gotten much better, she has vertigo just intermittently now.  She has not had any falls.    She continues to have significant trouble sleeping.  She did have an appointment with the sleep clinic, and is in the process of getting a sleep study scheduled.  She is taking Unisom or Zzzquil most every night.  She is experiencing fatigue since she is not sleeping well.    She also is experiencing pain in her neck and upper shoulders.  This has been going on for about 2 months as well.  Is painful to move her head in certain directions.  The pain does not radiate down her arms, there is no numbness or tingling radiating down her arms.  She has not tried anything in particular for the neck.        Review of Systems   Const, heent, cv, pulm, gi, msk, neuro reviewed,  otherwise negative unless noted above.        Objective    /64   Pulse 69   Temp 97.7  F (36.5  C) (Tympanic)   Resp 8   Ht 1.549 m (5' 1\")   Wt 44.5 kg (98 lb 3.2 oz)   SpO2 100%   BMI 18.55 kg/m  "   Body mass index is 18.55 kg/m .  Physical Exam   Gen: well appearing, pleasant woman, no distress  HEENT: PERRL, sclera nonicteric, MMM  Neck: supple, no LAD  Pulm: breathing comfortably, CTAB, no wheezes or rales  CV: RRR, normal S1 and S2, no murmurs  Abd: BS present, soft, nontender, nondistended  Ext: 2+ distal pulses, no LE edema    Neuro: A&O x 4. CN II-XII intact. 5/5 strength throughout UEs and LEs bilaterally. Patellar DTRs 2+ on the left, couldn't get the reflex on the right.  Sensation grossly intact to LT. Finger to nose intact. Normal gait.  Negative [place]-hallpike.

## 2021-09-18 LAB
ALBUMIN SERPL-MCNC: 3.7 G/DL (ref 3.4–5)
ALP SERPL-CCNC: 55 U/L (ref 40–150)
ALT SERPL W P-5'-P-CCNC: 20 U/L (ref 0–50)
ANION GAP SERPL CALCULATED.3IONS-SCNC: 1 MMOL/L (ref 3–14)
AST SERPL W P-5'-P-CCNC: 16 U/L (ref 0–45)
BILIRUB SERPL-MCNC: 0.3 MG/DL (ref 0.2–1.3)
BUN SERPL-MCNC: 13 MG/DL (ref 7–30)
CALCIUM SERPL-MCNC: 8.1 MG/DL (ref 8.5–10.1)
CHLORIDE BLD-SCNC: 106 MMOL/L (ref 94–109)
CO2 SERPL-SCNC: 31 MMOL/L (ref 20–32)
CREAT SERPL-MCNC: 0.67 MG/DL (ref 0.52–1.04)
FERRITIN SERPL-MCNC: 22 NG/ML (ref 12–150)
GFR SERPL CREATININE-BSD FRML MDRD: >90 ML/MIN/1.73M2
GLUCOSE BLD-MCNC: 81 MG/DL (ref 70–99)
POTASSIUM BLD-SCNC: 3.5 MMOL/L (ref 3.4–5.3)
PROT SERPL-MCNC: 7.5 G/DL (ref 6.8–8.8)
SODIUM SERPL-SCNC: 138 MMOL/L (ref 133–144)
TSH SERPL DL<=0.005 MIU/L-ACNC: 0.73 MU/L (ref 0.4–4)

## 2021-10-02 ENCOUNTER — HEALTH MAINTENANCE LETTER (OUTPATIENT)
Age: 41
End: 2021-10-02

## 2021-10-02 ENCOUNTER — HOSPITAL ENCOUNTER (OUTPATIENT)
Dept: MRI IMAGING | Facility: CLINIC | Age: 41
Discharge: HOME | End: 2021-10-02
Attending: INTERNAL MEDICINE | Admitting: INTERNAL MEDICINE
Payer: COMMERCIAL

## 2021-10-02 DIAGNOSIS — R51.9 DAILY HEADACHE: ICD-10-CM

## 2021-10-02 DIAGNOSIS — R42 VERTIGO: ICD-10-CM

## 2021-10-02 PROCEDURE — 70551 MRI BRAIN STEM W/O DYE: CPT

## 2021-10-05 ENCOUNTER — TELEPHONE (OUTPATIENT)
Dept: FAMILY MEDICINE | Facility: CLINIC | Age: 41
End: 2021-10-05

## 2021-10-05 NOTE — TELEPHONE ENCOUNTER
----- Message from Emily Riddle MD sent at 10/5/2021 10:55 AM CDT -----  Please call [facility] regarding her MRI results.  It looked normal, no mass or tumor causing her headaches or dizziness.  There is some evidence of prior scarring which may be due to migraine or previous inflammation. If she is still having bad headaches, we can do a virtual visit to discuss management if she'd like. Thank you!  Emily Riddle MD

## 2021-10-05 NOTE — LETTER
October 7, 2021      Simran Lizarraga  [address] [address]            Dear Simran,     This is in regards to the MRI results.  It looked normal, no mass or tumor causing her headaches or dizziness.  There is some evidence of prior scarring which may be due to migraine or previous inflammation. If she is still having bad headaches, we can do a virtual visit to discuss management if she'd like.  You may schedule via my chart or calling the clinic at [phone].    Sincerely,    Dr. Emily Riddle

## 2021-10-05 NOTE — TELEPHONE ENCOUNTER
Non detailed message left for pt to return call to clinic and ask to speak with a triage nurse via   services.  Advised results and available on my chart.    Priya GERARDO RN  EP Triage

## 2021-10-06 NOTE — TELEPHONE ENCOUNTER
2nd attempt.  Non detailed message left for pt to return call to clinic and ask to speak with a triage nurse via   services.    Priya GERARDO RN  EP Triage

## 2021-10-07 NOTE — TELEPHONE ENCOUNTER
3rd attempt.  Non detailed message left for pt to return call to clinic and ask to speak with a triage nurse.    Message from provider mailed home.    Priya GERARDO RN  EP Triage

## 2021-10-18 ENCOUNTER — TELEPHONE (OUTPATIENT)
Dept: SLEEP MEDICINE | Facility: CLINIC | Age: 41
End: 2021-10-18

## 2021-10-18 DIAGNOSIS — G47.00 PERSISTENT INSOMNIA: Primary | ICD-10-CM

## 2021-10-18 DIAGNOSIS — G89.29 CHRONIC NECK PAIN: ICD-10-CM

## 2021-10-18 DIAGNOSIS — G47.9 SLEEP DISTURBANCE: ICD-10-CM

## 2021-10-18 DIAGNOSIS — M54.2 CHRONIC NECK PAIN: ICD-10-CM

## 2021-10-18 DIAGNOSIS — G47.8 NON-RESTORATIVE SLEEP: ICD-10-CM

## 2021-10-18 DIAGNOSIS — R51.9 SLEEP RELATED HEADACHES: ICD-10-CM

## 2021-10-18 NOTE — TELEPHONE ENCOUNTER
Peer to Peer Request  Patient Name:                        Simran Lizarraga  :                                      1980  MRN:                                      [id number]    Dr. Pérez,    The authorization for procedure PSG diagnsotci on date of service 10/31/2021 has been denied. We were unsuccessful in obtaining approval through clinical review. A [id number] review can be done by calling the insurance/third party authorization vendor with the following information:    Insurance:         Auth Vendor:     Phone:             [phone]  Due:                ASAP  Patient ID:       [id number]  Case/Ref #:     [id number]    Denial Reason:  We are not approving this request because:    Information sent by your provider does not show you have at least one of the following:  o A previous in-home sleep study that was unable to confirm whether you have obstructive sleep apnea (SHANE/when breathing pauses during sleep because pf narrowed or blocked upper airway).  o A medical condition that would decrease the accuracy of a home sleep study.  o Suspicion of having a sleep disorder, besides SHANE, that could affect the accuracy of a home sleep study.  o Inability to use home sleep monitoring equipment safely.  o Factors in your living environment that would prevent an adequate home sleep study.    Please complete this as soon as you are able and let me know when it is done.    Thank you,  Vangie SPARKS    [facility]

## 2021-11-03 ENCOUNTER — OFFICE VISIT (OUTPATIENT)
Dept: SLEEP MEDICINE | Facility: CLINIC | Age: 41
End: 2021-11-03
Attending: INTERNAL MEDICINE
Payer: COMMERCIAL

## 2021-11-03 DIAGNOSIS — G47.9 SLEEP DISTURBANCE: ICD-10-CM

## 2021-11-03 DIAGNOSIS — R51.9 SLEEP RELATED HEADACHES: ICD-10-CM

## 2021-11-03 DIAGNOSIS — M54.2 CHRONIC NECK PAIN: ICD-10-CM

## 2021-11-03 DIAGNOSIS — G47.8 NON-RESTORATIVE SLEEP: ICD-10-CM

## 2021-11-03 DIAGNOSIS — G47.00 PERSISTENT INSOMNIA: ICD-10-CM

## 2021-11-03 DIAGNOSIS — G89.29 CHRONIC NECK PAIN: ICD-10-CM

## 2021-11-03 PROCEDURE — 95800 SLP STDY UNATTENDED: CPT | Performed by: INTERNAL MEDICINE

## 2021-11-03 NOTE — PROGRESS NOTES
Device has been registered and shipped via zerved on 11/03/21. Patient was notified that package was mailed out. Watch [facility] serial number [id number].

## 2021-11-15 NOTE — PROGRESS NOTES
Watch pat has been scored using rule 1B, 4%.   Patient to follow up with provider to determine appropriate therapy.     Pat AHI: 6.1    Ordering Provider: DO Sophie Spicer [facility]  Sleep Technologist  11/15/21

## 2021-11-16 ENCOUNTER — TELEPHONE (OUTPATIENT)
Dept: SLEEP MEDICINE | Facility: CLINIC | Age: 41
End: 2021-11-16
Payer: COMMERCIAL

## 2021-11-16 NOTE — PROCEDURES
"WatchPAT - HOME SLEEP STUDY INTERPRETATION    Patient: Simran Lizarraga  MRN: [id number]  YOB: 1980  Study Date: 11/12/21  Referring Provider: Mildred, Palatine Bigelow;   Ordering Provider: Ellis Pérez DO    Chain of custody patient verification was not enabled.  Chain of custody verification was not present throughout the entire study.     Indications for Home Study: Simran Lizarraga is a 41 year old female  who presents with symptoms suggestive of obstructive sleep apnea.    Estimated body mass index is 18.55 kg/m  as calculated from the following:    Height as of 9/17/21: 1.549 m (5' 1\").    Weight as of 9/17/21: 44.5 kg (98 lb 3.2 oz).  Total score - Circle: 2 (5/7/2021  9:00 AM)    Data: A full night home sleep study was performed recording the standard physiologic parameters including peripheral arterial tonometry (PAT), sound/snoring, body position,  movement, sound, and oxygen saturation by pulse oximetry. Pulse rate was estimated by oximetry recording. Sleep staging (wake, REM, light, and deep sleep) was derived from PAT signal.  This study was considered adequate based on > 4 hours of quality oximetry and respiratory recording. As specified by the AASM Manual for the Scoring of Sleep and Associated events, version 2.3, Rule VIII.D 1B, 4% oxygen desaturation scoring for hypopneas is used as a standard of care on all home sleep apnea testing.    Total Recording Time: 8 hrs, 45 min  Total Sleep Time: 8 hrs, 2 min  % of Sleep Time REM: 20%    Respiratory:  Snoring: Snoring was present.  Respiratory events: The PAT respiratory disturbance index [pRDI] was 6.7 events per hour.  The PAT apnea/hypopnea index [pAHI] was 6.1 events per hour.  WANG was 0.4 events per hour.  During REM sleep the pAHI was 3.7.  Sleep Associated Hypoxemia: sustained hypoxemia was not present. Mean oxygen saturation was 95%.  Minimum was 91%.  Time with saturation less than 88% was 0 minutes.    Heart Rate: By " pulse oximetry normal rate was noted.     Position: Percent of time spent: supine - 99.8%, prone - 0%, on right - 0.1%, on left - 0%.  pAHI was 6 per hour supine, N/A per hour prone, N/A per hour on right side, and N/A per hour on left side.     Assessment:   Mild obstructive sleep apnea.  Sleep associated hypoxemia was not present.    Recommendations:  Consider auto-CPAP at 5-15 cmH2O, oral appliance therapy or positional therapy.  Suggest optimizing sleep hygiene and avoiding sleep deprivation.  Weight management.    Diagnosis Code(s): Obstructive Sleep Apnea G47.33    Ellis Pérez DO, November 16, 2021   Diplomate, American Board of Internal Medicine, Sleep Medicine

## 2021-11-16 NOTE — TELEPHONE ENCOUNTER
Adrieltclandry. Please call the patient to schedule for an earlier follow-up visit to review the results of the sleep study.  Please schedule patient to follow-up with me next week. May book on my lunch hour except for Wednesdays and Fridays.Thank you.

## 2021-11-19 NOTE — TELEPHONE ENCOUNTER
Message left for patient to contact sleep clinic to schedule sooner follow up appointment. Dr Péerz will see patient over his noon hour except for on Wednesdays and Fridays.  Cyndie Whitfield MA

## 2022-01-22 ENCOUNTER — HEALTH MAINTENANCE LETTER (OUTPATIENT)
Age: 42
End: 2022-01-22

## 2022-09-03 ENCOUNTER — HEALTH MAINTENANCE LETTER (OUTPATIENT)
Age: 42
End: 2022-09-03

## 2023-04-29 ENCOUNTER — HEALTH MAINTENANCE LETTER (OUTPATIENT)
Age: 43
End: 2023-04-29

## 2024-02-17 ENCOUNTER — HEALTH MAINTENANCE LETTER (OUTPATIENT)
Age: 44
End: 2024-02-17

## 2024-07-06 ENCOUNTER — HEALTH MAINTENANCE LETTER (OUTPATIENT)
Age: 44
End: 2024-07-06

## 2025-04-22 ENCOUNTER — OFFICE VISIT (OUTPATIENT)
Dept: FAMILY MEDICINE | Facility: CLINIC | Age: 45
End: 2025-04-22
Payer: COMMERCIAL

## 2025-04-22 VITALS
BODY MASS INDEX: 17.85 KG/M2 | HEART RATE: 73 BPM | DIASTOLIC BLOOD PRESSURE: 81 MMHG | SYSTOLIC BLOOD PRESSURE: 127 MMHG | OXYGEN SATURATION: 100 % | HEIGHT: 62 IN | TEMPERATURE: 97.7 F | RESPIRATION RATE: 20 BRPM | WEIGHT: 97 LBS

## 2025-04-22 DIAGNOSIS — N94.6 DYSMENORRHEA: ICD-10-CM

## 2025-04-22 DIAGNOSIS — R51.9 CHRONIC NONINTRACTABLE HEADACHE, UNSPECIFIED HEADACHE TYPE: ICD-10-CM

## 2025-04-22 DIAGNOSIS — Z13.1 SCREENING FOR DIABETES MELLITUS: ICD-10-CM

## 2025-04-22 DIAGNOSIS — Z13.220 ENCOUNTER FOR LIPID SCREENING FOR CARDIOVASCULAR DISEASE: ICD-10-CM

## 2025-04-22 DIAGNOSIS — G89.29 CHRONIC NONINTRACTABLE HEADACHE, UNSPECIFIED HEADACHE TYPE: ICD-10-CM

## 2025-04-22 DIAGNOSIS — Z12.4 CERVICAL CANCER SCREENING: ICD-10-CM

## 2025-04-22 DIAGNOSIS — G47.33 OSA (OBSTRUCTIVE SLEEP APNEA): ICD-10-CM

## 2025-04-22 DIAGNOSIS — Z23 NEED FOR VACCINATION: ICD-10-CM

## 2025-04-22 DIAGNOSIS — Z00.00 ANNUAL PHYSICAL EXAM: Primary | ICD-10-CM

## 2025-04-22 DIAGNOSIS — Z12.31 VISIT FOR SCREENING MAMMOGRAM: ICD-10-CM

## 2025-04-22 DIAGNOSIS — Z13.6 ENCOUNTER FOR LIPID SCREENING FOR CARDIOVASCULAR DISEASE: ICD-10-CM

## 2025-04-22 DIAGNOSIS — N92.0 MENORRHAGIA WITH REGULAR CYCLE: ICD-10-CM

## 2025-04-22 DIAGNOSIS — G47.00 PERSISTENT INSOMNIA: ICD-10-CM

## 2025-04-22 LAB
ERYTHROCYTE [DISTWIDTH] IN BLOOD BY AUTOMATED COUNT: 13.2 % (ref 10–15)
EST. AVERAGE GLUCOSE BLD GHB EST-MCNC: 105 MG/DL
HBA1C MFR BLD: 5.3 % (ref 0–5.6)
HCT VFR BLD AUTO: 42.3 % (ref 35–47)
HGB BLD-MCNC: 13.5 G/DL (ref 11.7–15.7)
MCH RBC QN AUTO: 29.7 PG (ref 26.5–33)
MCHC RBC AUTO-ENTMCNC: 31.9 G/DL (ref 31.5–36.5)
MCV RBC AUTO: 93 FL (ref 78–100)
PLATELET # BLD AUTO: 260 10E3/UL (ref 150–450)
RBC # BLD AUTO: 4.55 10E6/UL (ref 3.8–5.2)
WBC # BLD AUTO: 3.9 10E3/UL (ref 4–11)

## 2025-04-22 PROCEDURE — 1126F AMNT PAIN NOTED NONE PRSNT: CPT

## 2025-04-22 PROCEDURE — 99386 PREV VISIT NEW AGE 40-64: CPT | Mod: 25

## 2025-04-22 PROCEDURE — 87624 HPV HI-RISK TYP POOLED RSLT: CPT

## 2025-04-22 PROCEDURE — 84443 ASSAY THYROID STIM HORMONE: CPT

## 2025-04-22 PROCEDURE — 36415 COLL VENOUS BLD VENIPUNCTURE: CPT

## 2025-04-22 PROCEDURE — 83550 IRON BINDING TEST: CPT

## 2025-04-22 PROCEDURE — 3079F DIAST BP 80-89 MM HG: CPT

## 2025-04-22 PROCEDURE — 80061 LIPID PANEL: CPT

## 2025-04-22 PROCEDURE — 82728 ASSAY OF FERRITIN: CPT

## 2025-04-22 PROCEDURE — 90715 TDAP VACCINE 7 YRS/> IM: CPT

## 2025-04-22 PROCEDURE — 83036 HEMOGLOBIN GLYCOSYLATED A1C: CPT

## 2025-04-22 PROCEDURE — 80053 COMPREHEN METABOLIC PANEL: CPT

## 2025-04-22 PROCEDURE — 90471 IMMUNIZATION ADMIN: CPT

## 2025-04-22 PROCEDURE — 3074F SYST BP LT 130 MM HG: CPT

## 2025-04-22 PROCEDURE — 99214 OFFICE O/P EST MOD 30 MIN: CPT | Mod: 25

## 2025-04-22 PROCEDURE — 83540 ASSAY OF IRON: CPT

## 2025-04-22 PROCEDURE — 85027 COMPLETE CBC AUTOMATED: CPT

## 2025-04-22 SDOH — HEALTH STABILITY: PHYSICAL HEALTH: ON AVERAGE, HOW MANY DAYS PER WEEK DO YOU ENGAGE IN MODERATE TO STRENUOUS EXERCISE (LIKE A BRISK WALK)?: 1 DAY

## 2025-04-22 ASSESSMENT — PAIN SCALES - GENERAL: PAINLEVEL_OUTOF10: NO PAIN (0)

## 2025-04-22 ASSESSMENT — SOCIAL DETERMINANTS OF HEALTH (SDOH): HOW OFTEN DO YOU GET TOGETHER WITH FRIENDS OR RELATIVES?: ONCE A WEEK

## 2025-04-22 NOTE — PROGRESS NOTES
Preventive Care Visit  [facility] DICK Estrella DNP, Geriatric Medicine  Apr 22, 2025      Assessment & Plan     Annual physical exam  Due for pap, will complete today. Provided phone number to schedule mammogram. Will update labs and vaccines today.  - Comprehensive metabolic panel    SHANE (obstructive sleep apnea)  Hx of mild SHANE noted on prior sleep study, recommend following up with sleep medicine for further recommendations on insomnia. Also recommend using CPAP to help with sleep and headache.  - CPAP Order for DME - ONLY FOR DME  - Adult Sleep Eval & Management Referral; Future  - CBC with platelets    Persistent insomnia  Chronic, stable. Recommend using CPAP to optimize quality of sleep, follow-up with sleep medicine for further evaluation.  - Adult Sleep Eval & Management Referral; Future    Chronic nonintractable headache, unspecified headache type  Chronic, stable. Previously worked up, MRI reassuring with possible findings consistent with migraine. HA have improved from prior years. Recommend using CPAP to aid with sleep and SHANE.    Dysmenorrhea  Menorrhagia with regular cycle  Having regular but somewhat painful menses. Will check labs/imaging with follow-up to OBGYN for further evaluation/management  - US Pelvic Transabdominal and Transvaginal; Future  - Ob/Gyn  Referral; Future  - Ferritin  - Iron & Iron Binding Capacity  - CBC with platelets  - TSH with free T4 reflex    Cervical cancer screening  - HPV and Gynecologic Cytology Panel - Recommended Age 30 - 65 Years    Screening for diabetes mellitus  - Hemoglobin A1c    Visit for screening mammogram  - MA Screening Bilateral w/ Rich; Future    Encounter for lipid screening for cardiovascular disease  - Lipid panel reflex to direct LDL Fasting    Need for vaccination  - TDAP 10-64Y (ADACEL,BOOSTRIX)    Patient has been advised of split billing requirements and indicates understanding: Yes        Counseling  Appropriate  preventive services were addressed with this patient via screening, questionnaire, or discussion as appropriate for fall prevention, nutrition, physical activity, Tobacco-use cessation, social engagement, weight loss and cognition.  Checklist reviewing preventive services available has been given to the patient.  Reviewed patient's diet, addressing concerns and/or questions.   She is at risk for lack of exercise and has been provided with information to increase physical activity for the benefit of her well-being.   She is at risk for psychosocial distress and has been provided with information to reduce risk.       Follow-up       Subjective   Simran is a 45 year old, presenting for the following:  Physical (Patient is here for annual preventative physical.  ) and Establish Care (Establish care with different provider.)        4/22/2025     9:20 AM   Additional Questions   Roomed by Alfredo MARKS MA   Accompanied by Self          Here for physical    Having regular menstrual cycles, reports heavy and painful cramping, uses ibuprofen/tylenol for relief. Having to change super tampons every 2-3 hours initially.               Advance Care Planning    Discussed advance care planning with patient; however, patient declined at this time.        4/22/2025   General Health   How would you rate your overall physical health? (!) FAIR   Feel stress (tense, anxious, or unable to sleep) Rather much   (!) STRESS CONCERN      4/22/2025   Nutrition   Three or more servings of calcium each day? Yes   Diet: Regular (no restrictions)   How many servings of fruit and vegetables per day? (!) 2-3   How many sweetened beverages each day? 0-1         4/22/2025   Exercise   Days per week of moderate/strenous exercise 1 day   (!) EXERCISE CONCERN      4/22/2025   Social Factors   Frequency of gathering with friends or relatives Once a week   Worry food won't last until get money to buy more No   Food not last or not have enough money for food?  No   Do you have housing? (Housing is defined as stable permanent housing and does not include staying ouside in a car, in a tent, in an abandoned building, in an overnight shelter, or couch-surfing.) Yes   Are you worried about losing your housing? No   Lack of transportation? No   Unable to get utilities (heat,electricity)? No         4/22/2025   Dental   Dentist two times every year? Yes         Today's PHQ-2 Score:       4/22/2025     9:15 AM   PHQ-2 ( 1999 Pfizer)   Q1: Little interest or pleasure in doing things 0   Q2: Feeling down, depressed or hopeless 0   PHQ-2 Score 0    Q1: Little interest or pleasure in doing things Not at all   Q2: Feeling down, depressed or hopeless Not at all   PHQ-2 Score 0       Patient-reported           4/22/2025   Substance Use   Alcohol more than 3/day or more than 7/wk No   Do you use any other substances recreationally? No     Social History     Tobacco Use    Smoking status: Never    Smokeless tobacco: Never   Substance Use Topics    Alcohol use: Yes     Comment: sometimes    Drug use: No          Mammogram Screening - Mammogram every 1-2 years updated in Health Maintenance based on mutual decision making        4/22/2025   STI Screening   New sexual partner(s) since last STI/HIV test? No     History of abnormal Pap smear: No - age 30- 64 PAP with HPV every 5 years recommended        Latest Ref Rng & Units 4/22/2025     9:58 AM 11/27/2020     3:15 PM 11/27/2020     2:59 PM   PAP / HPV   PAP (Historical)    NIL    HPV 16 DNA Negative Negative  Negative     HPV 18 DNA Negative Negative  Negative     Other HR HPV Negative Negative  Negative       ASCVD Risk   The 10-year ASCVD risk score (Sunil MATA, et al., 2019) is: 0.2%    Values used to calculate the score:      Age: 45 years      Sex: Female      Is Non- : No      Diabetic: No      Tobacco smoker: No      Systolic Blood Pressure: 127 mmHg      Is BP treated: No      HDL Cholesterol: 95  "mg/dL      Total Cholesterol: 148 mg/dL        4/22/2025   Contraception/Family Planning   Questions about contraception or family planning No        Reviewed and updated as needed this visit by Provider   Tobacco   Meds  Problems  Med Hx  Surg Hx  Fam Hx  Soc Hx Sexual   Activity          Past Medical History:   Diagnosis Date    Insomnia     SHANE (obstructive sleep apnea)      Past Surgical History:   Procedure Laterality Date    LAPAROSCOPIC APPENDECTOMY  04/2022    LASIK  2011    [facility] SLEEP STUDY, UNATTENDED, RECORD HEART RATE/O2 SAT/RESP ANAL/SLEEP TM  11/16/2021          Lab work is in process  Labs reviewed in EPIC  BP Readings from Last 3 Encounters:   04/22/25 127/81   09/17/21 108/64   11/27/20 106/68    Wt Readings from Last 3 Encounters:   04/22/25 44 kg (97 lb)   09/17/21 44.5 kg (98 lb 3.2 oz)   05/07/21 43.5 kg (96 lb)                      Review of Systems  Constitutional, HEENT, cardiovascular, pulmonary, gi and gu systems are negative, except as otherwise noted.     Objective    Exam  /81 (BP Location: Left arm, Patient Position: Sitting, Cuff Size: Adult Small)   Pulse 73   Temp 97.7  F (36.5  C) (Temporal)   Resp 20   Ht 1.575 m (5' 2\")   Wt 44 kg (97 lb)   LMP 04/09/2025 (Approximate)   SpO2 100%   BMI 17.74 kg/m     Estimated body mass index is 17.74 kg/m  as calculated from the following:    Height as of this encounter: 1.575 m (5' 2\").    Weight as of this encounter: 44 kg (97 lb).    Physical Exam  Vitals reviewed.   HENT:      Head: Normocephalic.   Eyes:      Pupils: Pupils are equal, round, and reactive to light.   Cardiovascular:      Rate and Rhythm: Normal rate and regular rhythm.      Pulses: Normal pulses.      Heart sounds: Normal heart sounds. No murmur heard.  Pulmonary:      Effort: Pulmonary effort is normal. No respiratory distress.      Breath sounds: Normal breath sounds. No wheezing, rhonchi or rales.   Chest:   Breasts:     Right: Normal.      Left: " Normal.   Abdominal:      General: Bowel sounds are normal. There is no distension.      Palpations: Abdomen is soft. There is no mass.      Tenderness: There is no abdominal tenderness.   Genitourinary:     General: Normal vulva.      Vagina: Normal.      Cervix: Friability present. No cervical motion tenderness, discharge, lesion, erythema, cervical bleeding or eversion.   Musculoskeletal:         General: Normal range of motion.      Cervical back: Normal range of motion and neck supple.      Right lower leg: No edema.      Left lower leg: No edema.   Skin:     General: Skin is warm and dry.   Neurological:      Mental Status: She is alert and oriented to person, place, and time.   Psychiatric:         Mood and Affect: Mood normal.         Behavior: Behavior normal.               Signed Electronically by: Patience Estrella, DNP, APRN, CNP

## 2025-04-22 NOTE — NURSING NOTE
Prior to immunization administration, verified patients identity using patient s name and date of birth. Please see Immunization Activity for additional information.     Screening Questionnaire for Adult Immunization    Are you sick today?   No   Do you have allergies to medications, food, a vaccine component or latex?   No   Have you ever had a serious reaction after receiving a vaccination?   No   Do you have a long-term health problem with heart, lung, kidney, or metabolic disease (e.g., diabetes), asthma, a blood disorder, no spleen, complement component deficiency, a cochlear implant, or a spinal fluid leak?  Are you on long-term aspirin therapy?   No   Do you have cancer, leukemia, HIV/AIDS, or any other immune system problem?   No   Do you have a parent, brother, or sister with an immune system problem?   No   In the past 3 months, have you taken medications that affect  your immune system, such as prednisone, other steroids, or anticancer drugs; drugs for the treatment of rheumatoid arthritis, Crohn s disease, or psoriasis; or have you had radiation treatments?   No   Have you had a seizure, or a brain or other nervous system problem?   No   During the past year, have you received a transfusion of blood or blood    products, or been given immune (gamma) globulin or antiviral drug?   No   For women: Are you pregnant or is there a chance you could become       pregnant during the next month?   No   Have you received any vaccinations in the past 4 weeks?   No     Immunization questionnaire answers were all negative.    Patient is receiving Tdap.        Patient instructed to remain in clinic for 15 minutes afterwards, and to report any adverse reactions.     Screening performed by Alfredo Vega MA on 4/22/2025 at 10:16 AM.

## 2025-04-23 ENCOUNTER — PATIENT OUTREACH (OUTPATIENT)
Dept: CARE COORDINATION | Facility: CLINIC | Age: 45
End: 2025-04-23
Payer: COMMERCIAL

## 2025-04-23 LAB
ALBUMIN SERPL BCG-MCNC: 4.2 G/DL (ref 3.5–5.2)
ALP SERPL-CCNC: 57 U/L (ref 40–150)
ALT SERPL W P-5'-P-CCNC: 15 U/L (ref 0–50)
ANION GAP SERPL CALCULATED.3IONS-SCNC: 11 MMOL/L (ref 7–15)
AST SERPL W P-5'-P-CCNC: 22 U/L (ref 0–45)
BILIRUB SERPL-MCNC: 0.4 MG/DL
BUN SERPL-MCNC: 11.8 MG/DL (ref 6–20)
CALCIUM SERPL-MCNC: 8.8 MG/DL (ref 8.8–10.4)
CHLORIDE SERPL-SCNC: 101 MMOL/L (ref 98–107)
CHOLEST SERPL-MCNC: 148 MG/DL
CREAT SERPL-MCNC: 0.61 MG/DL (ref 0.51–0.95)
EGFRCR SERPLBLD CKD-EPI 2021: >90 ML/MIN/1.73M2
FASTING STATUS PATIENT QL REPORTED: YES
FASTING STATUS PATIENT QL REPORTED: YES
FERRITIN SERPL-MCNC: 22 NG/ML (ref 6–175)
GLUCOSE SERPL-MCNC: 89 MG/DL (ref 70–99)
HCO3 SERPL-SCNC: 24 MMOL/L (ref 22–29)
HDLC SERPL-MCNC: 95 MG/DL
HPV HR 12 DNA CVX QL NAA+PROBE: NEGATIVE
HPV16 DNA CVX QL NAA+PROBE: NEGATIVE
HPV18 DNA CVX QL NAA+PROBE: NEGATIVE
HUMAN PAPILLOMA VIRUS FINAL DIAGNOSIS: NORMAL
IRON BINDING CAPACITY (ROCHE): 328 UG/DL (ref 240–430)
IRON SATN MFR SERPL: 31 % (ref 15–46)
IRON SERPL-MCNC: 101 UG/DL (ref 37–145)
LDLC SERPL CALC-MCNC: 38 MG/DL
NONHDLC SERPL-MCNC: 53 MG/DL
POTASSIUM SERPL-SCNC: 4 MMOL/L (ref 3.4–5.3)
PROT SERPL-MCNC: 7.4 G/DL (ref 6.4–8.3)
SODIUM SERPL-SCNC: 136 MMOL/L (ref 135–145)
TRIGL SERPL-MCNC: 75 MG/DL
TSH SERPL DL<=0.005 MIU/L-ACNC: 1.27 UIU/ML (ref 0.3–4.2)

## 2025-04-24 NOTE — RESULT ENCOUNTER NOTE
Hi, Simran!    Good news - your labs all look stable. Iron stores are on the low side so please ensure you are getting adequate iron in your diet. You do not have anemia. Please let me know if you have any specific questions or concerns.    Take care,  Patience Estrella DNP, APRN, CNP